# Patient Record
Sex: FEMALE | Race: BLACK OR AFRICAN AMERICAN
[De-identification: names, ages, dates, MRNs, and addresses within clinical notes are randomized per-mention and may not be internally consistent; named-entity substitution may affect disease eponyms.]

---

## 2017-09-13 ENCOUNTER — HOSPITAL ENCOUNTER (OUTPATIENT)
Dept: HOSPITAL 61 - PCVCIMAG | Age: 32
Discharge: HOME | End: 2017-09-13
Attending: INTERNAL MEDICINE
Payer: COMMERCIAL

## 2017-09-13 DIAGNOSIS — I07.1: Primary | ICD-10-CM

## 2017-09-13 PROCEDURE — 93306 TTE W/DOPPLER COMPLETE: CPT

## 2017-09-13 PROCEDURE — G0463 HOSPITAL OUTPT CLINIC VISIT: HCPCS

## 2017-09-13 PROCEDURE — 93351 STRESS TTE COMPLETE: CPT

## 2017-09-13 NOTE — PCVCIMAG
--------------- APPROVED REPORT --------------





Study performed:  2017 14:10:20



EXAM: Comprehensive 2D, Doppler, and color-flow 

Echocardiogram

Patient Location: Echo lab

Status:  routine



BSA:         1.85

HR: 76 bpmBP:          122/92 mmHg

Rhythm: NSR



Other Information 

Study Quality: Adequate



Indications

Dyspnea 

Chest Pain



2D Dimensions

LVEF(%):  55.85 (&gt;50%)

IVSd:  9.36 (7-11mm)

LVDd:  39.17 mm

PWd:  11.02 (7-11mm)

LVDs:  27.94 (25-40mm)

Left Atrium:  35.24 (27-40mm)

Aortic Root:  27.64 mm

LV Single Plane 4CH:  54.00 %

LV Single Plane 2CH:  67.79 %Valles's LVEF:  60.90 %

Biplane EF:  61.1 %



Volumes

Left Atrial Volume (Systole)

Single Plane 4CH:  51.59 mLSingle Plane 2CH:  43.72 mL

LA ESV Index:  28.00 mL/m2



Aortic Valve

AoV Peak Moi.:  1.35 m/s

AO Peak Gr.:  7.24 mmHgLVOT Max P.58 mmHg

LVOT Max V:  1.18 m/s



Mitral Valve

E/A Ratio:  1.4

MV Decel. Time:  263.77 ms

MV E Max Moi.:  0.67 m/s

MV A Moi.:  0.49 m/s

IVRT:  110.73 ms



Pulmonary Valve

PV Peak Moi.:  1.12 m/sPV Peak Gr.:  5.03 mmHg



Pulmonary Vein

P Vein S:    0.39 m/sP Vein A:  0.34 m/s

P Vein D:   0.47 m/sP Vein A Dur.:  96.9 msec

P Vein S/D Ratio:  0.83



Tricuspid Valve

TR Peak Moi.:  2.03 m/s

TR Peak Gr.:  16.42 mmHg



Left Ventricle

The left ventricle is normal size. There is normal LV segmental wall 

motion. There is normal left ventricular wall thickness. Left 

ventricular systolic function is normal. The left ventricular 

ejection fraction is within the normal range. LVEF is 65%. The left 

ventricular diastolic function is normal.



Right Ventricle

The right ventricle is normal size. The right ventricular systolic 

function is normal.



Atria

The left atrium size is normal. The right atrium size is 

normal.



Aortic Valve

The aortic valve is normal in structure. No aortic regurgitation is 

present. There is no aortic valvular stenosis.



Mitral Valve

The mitral valve is normal in structure. There is no mitral valve 

regurgitation noted. No evidence of mitral valve stenosis.



Tricuspid Valve

The tricuspid valve is normal in structure. Trace tricuspid 

regurgitation with PAP of 23 mmHg.



Pulmonic Valve

The pulmonary valve is normal in structure. There is no pulmonic 

valvular regurgitation.



Great Vessels

The aortic root is normal in size. IVC is normal in size and 

collapses with &gt;50% inspiration



Pericardium

There is no pericardial effusion.



&lt;Conclusion&gt;

The left ventricle is normal size.

Left ventricular systolic function is normal.

The right ventricle is normal size.

The left atrium size is normal.

The aortic valve is normal in structure.

The mitral valve is normal in structure.

Trace tricuspid regurgitation with PAP of 23 mmHg.

There is no pericardial effusion.

## 2017-09-13 NOTE — PCVCIMAG
--------------- APPROVED REPORT --------------





Exam:  Stress Echocardiogram

Indication: Chest pain , Dyspnea

Patient Location: Echo lab

Stress Nurse: Maribel Long RN

Status: routine



Ht: 5 ft 9 in  

HR: 90 bpm      BP: 122/92 mmHg

Rhythm: NSR



Procedure

The patient underwent an Exercise Stress Test using the Dylan 

Protocol. Blood pressure, heart rate, and EKG were monitored.

An Echocardiogram was performed by technician in four stages in quad 

fashion.  At peak stress, four selected images were obtained and 

placed side by side with resting images for comparison.



Stress Test Details

Stress Test:  Exercise stress testing was performed using a Dylan 

protocol.

HR

Resting HR:            90 bpmMax Heart Rate (APMHR): 189 bpm 

Max HR Achieved:  196 bpmTarget HR (85% APMHR): 160 bpm

% of APMHR:         103

Recovery HR:            125 bpm

HR response to stress: Normal HR response to stress



BP

Resting BP:  122/92 mmHg

Max BP:       146/80 mmHg

Recovery BP:       120/70 mmHg



ECG

Resting ECG:  Sinus Rhythm

Stress ECG:     Sinus Rhythm

ST Change: Normal

Arrhythmia:    None

Recovery ECG: Sinus Rhythm

Recovery ST Change: Normal

Recovery Arrhythmia: None



Clinical

Reason for Termination: Maximal effort

Exercise duration: 9 min 42 sec

Highest Stage Achieved: Stage 4: 4.2 mph at 16% grade. 

Exercise capacity: 12.4 METs



Pre-Stress Echo

The resting Echocardiogram showed normal left ventricular 

contractility with an estimated Ejection Fraction of about &gt;55%. 

Normal wall motion in all segments on baseline images.



Post-Stress Echo

The stress Echocardiogram showed normal left ventricular 

contractility with an estimated Ejection Fraction of about 65%. 

Normal augmentation of wall motion in all segments on post stress 

images.



Clinical

No clinical or ECG evidence for ischemia.



Conclusion

Clinical Response:  Non-ischemic

Exercise Capacity:  Above average

Stress ECG Response:  Non-ischemic

Stress Echo Images:  Non-ischemic

The left ventricle is normal in size and wall thickness in both the 

rest and stress images.



Other Information

Study Quality: Good



&lt;Conclusion&gt;

The left ventricle is normal in size and wall thickness in both the 

rest and stress images.

## 2017-12-27 ENCOUNTER — HOSPITAL ENCOUNTER (INPATIENT)
Dept: HOSPITAL 35 - ER | Age: 32
LOS: 2 days | Discharge: HOME | DRG: 563 | End: 2017-12-29
Attending: FAMILY MEDICINE | Admitting: FAMILY MEDICINE
Payer: COMMERCIAL

## 2017-12-27 VITALS — SYSTOLIC BLOOD PRESSURE: 144 MMHG | DIASTOLIC BLOOD PRESSURE: 87 MMHG

## 2017-12-27 VITALS — SYSTOLIC BLOOD PRESSURE: 121 MMHG | DIASTOLIC BLOOD PRESSURE: 68 MMHG

## 2017-12-27 VITALS — SYSTOLIC BLOOD PRESSURE: 135 MMHG | DIASTOLIC BLOOD PRESSURE: 72 MMHG

## 2017-12-27 VITALS — WEIGHT: 160 LBS | HEIGHT: 69.02 IN | BODY MASS INDEX: 23.7 KG/M2

## 2017-12-27 VITALS — SYSTOLIC BLOOD PRESSURE: 144 MMHG | DIASTOLIC BLOOD PRESSURE: 90 MMHG
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
## 2017-12-27 DIAGNOSIS — S82.391A: Primary | ICD-10-CM
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
## 2017-12-27 DIAGNOSIS — S82.61XA: ICD-10-CM
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
## 2017-12-27 PROCEDURE — 50101: CPT

## 2017-12-27 PROCEDURE — 2W3LX1Z IMMOBILIZATION OF RIGHT LOWER EXTREMITY USING SPLINT: ICD-10-PCS

## 2017-12-27 PROCEDURE — 62900: CPT

## 2017-12-27 PROCEDURE — 10790: CPT

## 2017-12-27 PROCEDURE — 70005: CPT

## 2017-12-27 PROCEDURE — 50010 RENAL EXPLORATION: CPT

## 2017-12-27 PROCEDURE — 52121: CPT

## 2017-12-27 PROCEDURE — 62110: CPT

## 2017-12-28 VITALS — DIASTOLIC BLOOD PRESSURE: 81 MMHG | SYSTOLIC BLOOD PRESSURE: 119 MMHG

## 2017-12-28 VITALS — SYSTOLIC BLOOD PRESSURE: 134 MMHG | DIASTOLIC BLOOD PRESSURE: 86 MMHG

## 2017-12-28 VITALS — SYSTOLIC BLOOD PRESSURE: 138 MMHG | DIASTOLIC BLOOD PRESSURE: 88 MMHG

## 2017-12-28 VITALS — DIASTOLIC BLOOD PRESSURE: 83 MMHG | SYSTOLIC BLOOD PRESSURE: 122 MMHG

## 2017-12-28 VITALS — DIASTOLIC BLOOD PRESSURE: 76 MMHG | SYSTOLIC BLOOD PRESSURE: 108 MMHG

## 2017-12-28 VITALS — DIASTOLIC BLOOD PRESSURE: 88 MMHG | SYSTOLIC BLOOD PRESSURE: 138 MMHG

## 2017-12-28 LAB
ANION GAP SERPL CALC-SCNC: 8 MMOL/L (ref 7–16)
BASOPHILS NFR BLD AUTO: 0.5 % (ref 0–2)
BUN SERPL-MCNC: 8 MG/DL (ref 7–18)
CALCIUM SERPL-MCNC: 8.9 MG/DL (ref 8.5–10.1)
CHLORIDE SERPL-SCNC: 105 MMOL/L (ref 98–107)
CO2 SERPL-SCNC: 26 MMOL/L (ref 21–32)
CREAT SERPL-MCNC: 0.7 MG/DL (ref 0.6–1)
EOSINOPHIL NFR BLD: 2.3 % (ref 0–3)
ERYTHROCYTE [DISTWIDTH] IN BLOOD BY AUTOMATED COUNT: 12.2 % (ref 10.5–14.5)
GLUCOSE SERPL-MCNC: 98 MG/DL (ref 74–106)
GRANULOCYTES NFR BLD MANUAL: 59.1 % (ref 36–66)
HCT VFR BLD CALC: 32.8 % (ref 37–47)
HGB BLD-MCNC: 11.3 GM/DL (ref 12–15)
LYMPHOCYTES NFR BLD AUTO: 32.6 % (ref 24–44)
MCH RBC QN AUTO: 27.3 PG (ref 26–34)
MCHC RBC AUTO-ENTMCNC: 34.3 G/DL (ref 28–37)
MCV RBC: 79.5 FL (ref 80–100)
MONOCYTES NFR BLD: 5.5 % (ref 1–8)
NEUTROPHILS # BLD: 3.9 THOU/UL (ref 1.4–8.2)
PLATELET # BLD: 177 THOU/UL (ref 150–400)
POTASSIUM SERPL-SCNC: 3.7 MMOL/L (ref 3.5–5.1)
RBC # BLD AUTO: 4.12 MIL/UL (ref 4.2–5)
SODIUM SERPL-SCNC: 139 MMOL/L (ref 136–145)
WBC # BLD AUTO: 6.7 THOU/UL (ref 4–11)

## 2017-12-28 PROCEDURE — 0QSGXZZ REPOSITION RIGHT TIBIA, EXTERNAL APPROACH: ICD-10-PCS | Performed by: ORTHOPAEDIC SURGERY

## 2017-12-29 VITALS — DIASTOLIC BLOOD PRESSURE: 72 MMHG | SYSTOLIC BLOOD PRESSURE: 108 MMHG

## 2017-12-29 VITALS — SYSTOLIC BLOOD PRESSURE: 103 MMHG | DIASTOLIC BLOOD PRESSURE: 59 MMHG

## 2017-12-29 VITALS — DIASTOLIC BLOOD PRESSURE: 59 MMHG | SYSTOLIC BLOOD PRESSURE: 103 MMHG

## 2017-12-29 VITALS — DIASTOLIC BLOOD PRESSURE: 68 MMHG | SYSTOLIC BLOOD PRESSURE: 121 MMHG

## 2018-01-09 ENCOUNTER — HOSPITAL ENCOUNTER (OUTPATIENT)
Dept: HOSPITAL 35 - OR | Age: 33
LOS: 1 days | Discharge: HOME | End: 2018-01-10
Attending: ORTHOPAEDIC SURGERY
Payer: COMMERCIAL

## 2018-01-09 VITALS — DIASTOLIC BLOOD PRESSURE: 83 MMHG | SYSTOLIC BLOOD PRESSURE: 132 MMHG

## 2018-01-09 VITALS — SYSTOLIC BLOOD PRESSURE: 136 MMHG | DIASTOLIC BLOOD PRESSURE: 85 MMHG

## 2018-01-09 VITALS — BODY MASS INDEX: 22.81 KG/M2 | WEIGHT: 154 LBS | HEIGHT: 69 IN

## 2018-01-09 VITALS — SYSTOLIC BLOOD PRESSURE: 147 MMHG | DIASTOLIC BLOOD PRESSURE: 69 MMHG

## 2018-01-09 VITALS — SYSTOLIC BLOOD PRESSURE: 141 MMHG | DIASTOLIC BLOOD PRESSURE: 85 MMHG

## 2018-01-09 VITALS — SYSTOLIC BLOOD PRESSURE: 139 MMHG | DIASTOLIC BLOOD PRESSURE: 84 MMHG
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
## 2018-01-09 DIAGNOSIS — Z79.891: ICD-10-CM
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
## 2018-01-09 DIAGNOSIS — S82.451A: ICD-10-CM
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
## 2018-01-09 DIAGNOSIS — S82.251A: Primary | ICD-10-CM

## 2018-01-09 PROCEDURE — 50386 REMOVE STENT VIA TRANSURETH: CPT

## 2018-01-09 PROCEDURE — 50635: CPT

## 2018-01-09 PROCEDURE — 50101: CPT

## 2018-01-09 PROCEDURE — 51412: CPT

## 2018-01-09 PROCEDURE — 70005: CPT

## 2018-01-09 PROCEDURE — 62900: CPT

## 2018-01-09 PROCEDURE — 57091: CPT

## 2018-01-09 PROCEDURE — 55445: CPT

## 2018-01-09 PROCEDURE — 64037: CPT

## 2018-01-09 PROCEDURE — 50010 RENAL EXPLORATION: CPT

## 2018-01-09 PROCEDURE — 62110: CPT

## 2018-01-09 PROCEDURE — 65100: CPT

## 2018-01-10 VITALS — DIASTOLIC BLOOD PRESSURE: 65 MMHG | SYSTOLIC BLOOD PRESSURE: 121 MMHG

## 2018-01-10 VITALS — SYSTOLIC BLOOD PRESSURE: 122 MMHG | DIASTOLIC BLOOD PRESSURE: 64 MMHG
